# Patient Record
Sex: MALE | Race: WHITE | NOT HISPANIC OR LATINO
[De-identification: names, ages, dates, MRNs, and addresses within clinical notes are randomized per-mention and may not be internally consistent; named-entity substitution may affect disease eponyms.]

---

## 2019-12-18 PROBLEM — Z00.00 ENCOUNTER FOR PREVENTIVE HEALTH EXAMINATION: Status: ACTIVE | Noted: 2019-12-18

## 2020-01-10 ENCOUNTER — APPOINTMENT (OUTPATIENT)
Dept: INTERNAL MEDICINE | Facility: CLINIC | Age: 25
End: 2020-01-10
Payer: COMMERCIAL

## 2020-01-10 VITALS
RESPIRATION RATE: 16 BRPM | SYSTOLIC BLOOD PRESSURE: 188 MMHG | HEIGHT: 71 IN | DIASTOLIC BLOOD PRESSURE: 83 MMHG | BODY MASS INDEX: 26.32 KG/M2 | WEIGHT: 188 LBS | HEART RATE: 88 BPM

## 2020-01-10 DIAGNOSIS — Z23 ENCOUNTER FOR IMMUNIZATION: ICD-10-CM

## 2020-01-10 DIAGNOSIS — Z71.89 OTHER SPECIFIED COUNSELING: ICD-10-CM

## 2020-01-10 DIAGNOSIS — Z72.89 OTHER PROBLEMS RELATED TO LIFESTYLE: ICD-10-CM

## 2020-01-10 PROCEDURE — 90691 TYPHOID VACCINE IM: CPT

## 2020-01-10 PROCEDURE — 99203 OFFICE O/P NEW LOW 30 MIN: CPT | Mod: 25

## 2020-01-10 PROCEDURE — 90471 IMMUNIZATION ADMIN: CPT

## 2020-01-10 RX ORDER — AZITHROMYCIN 250 MG/1
250 TABLET, FILM COATED ORAL
Qty: 1 | Refills: 0 | Status: ACTIVE | COMMUNITY
Start: 2020-01-10 | End: 1900-01-01

## 2020-01-10 RX ORDER — ATOVAQUONE AND PROGUANIL HYDROCHLORIDE 250; 100 MG/1; MG/1
250-100 TABLET, FILM COATED ORAL DAILY
Qty: 16 | Refills: 0 | Status: ACTIVE | COMMUNITY
Start: 2020-01-10 | End: 1900-01-01

## 2020-01-10 RX ORDER — FLUTICASONE PROPIONATE 100 %
POWDER (GRAM) MISCELLANEOUS
Refills: 0 | Status: ACTIVE | COMMUNITY

## 2020-01-10 RX ORDER — MONTELUKAST 10 MG/1
TABLET, FILM COATED ORAL
Refills: 0 | Status: ACTIVE | COMMUNITY

## 2020-01-10 NOTE — PHYSICAL EXAM
[General Appearance - Alert] : alert [General Appearance - In No Acute Distress] : in no acute distress [Sclera] : the sclera and conjunctiva were normal [PERRL With Normal Accommodation] : pupils were equal in size, round, reactive to light [Extraocular Movements] : extraocular movements were intact [Outer Ear] : the ears and nose were normal in appearance [Oropharynx] : the oropharynx was normal with no thrush [Auscultation Breath Sounds / Voice Sounds] : lungs were clear to auscultation bilaterally [Heart Rate And Rhythm] : heart rate was normal and rhythm regular [Heart Sounds] : normal S1 and S2 [Heart Sounds Gallop] : no gallops [Murmurs] : no murmurs [Full Pulse] : the pedal pulses are present [Heart Sounds Pericardial Friction Rub] : no pericardial rub [Edema] : there was no peripheral edema [Bowel Sounds] : normal bowel sounds [Abdomen Tenderness] : non-tender [Abdomen Soft] : soft [Abdomen Mass (___ Cm)] : no abdominal mass palpated [Musculoskeletal - Swelling] : no joint swelling [Costovertebral Angle Tenderness] : no CVA tenderness [Nail Clubbing] : no clubbing  or cyanosis of the fingernails [Skin Color & Pigmentation] : normal skin color and pigmentation [Motor Tone] : muscle strength and tone were normal [Sensation] : the sensory exam was normal to light touch and pinprick [Deep Tendon Reflexes (DTR)] : deep tendon reflexes were 2+ and symmetric [] : no rash [No Focal Deficits] : no focal deficits [Affect] : the affect was normal [Oriented To Time, Place, And Person] : oriented to person, place, and time

## 2020-01-10 NOTE — HISTORY OF PRESENT ILLNESS
[FreeTextEntry1] : This 24 y.o. 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 \par Then they are traveling to [facility] x2 days\par \par They plan to return on 2/23/2020.\par \par No prior vaccination for hepatitis A\par no prior vaccination for Typhoid\par \par not an adventure eater. \par \par
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
## 2020-10-24 ENCOUNTER — EMERGENCY (EMERGENCY)
Facility: HOSPITAL | Age: 25
LOS: 1 days | Discharge: HOME | End: 2020-10-24
Attending: EMERGENCY MEDICINE
Payer: SELF-PAY

## 2020-10-24 VITALS
DIASTOLIC BLOOD PRESSURE: 78 MMHG | TEMPERATURE: 98 F | HEART RATE: 75 BPM | SYSTOLIC BLOOD PRESSURE: 136 MMHG | HEIGHT: 71 IN | OXYGEN SATURATION: 98 % | WEIGHT: 190.04 LBS | RESPIRATION RATE: 18 BRPM

## 2020-10-24 PROCEDURE — 12011 RPR F/E/E/N/L/M 2.5 CM/<: CPT

## 2020-10-24 PROCEDURE — 99282 EMERGENCY DEPT VISIT SF MDM: CPT | Mod: 25

## 2020-10-24 PROCEDURE — 99283 EMERGENCY DEPT VISIT LOW MDM: CPT | Mod: 25

## 2020-10-24 NOTE — ED PROVIDER NOTE - PATIENT PORTAL LINK FT
You can access the FollowMyHealth Patient Portal offered by [facility] by registering at the following website: [email or web address]. By joining [employer]’s FollowMyHealth portal, you will also be able to view your health information using other applications (apps) compatible with our system.
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
## 2020-10-24 NOTE — ED PROVIDER NOTE - NSFOLLOWUPINSTRUCTIONS_ED_ALL_ED_FT
Please do not apply Lotion to your laceration site  No water to face for 48 hrs.   F/U with your PCP as discussed.

## 2021-04-18 ENCOUNTER — TRANSCRIPTION ENCOUNTER (OUTPATIENT)
Age: 26
End: 2021-04-18

## 2021-04-19 ENCOUNTER — APPOINTMENT (OUTPATIENT)
Dept: FAMILY MEDICINE | Facility: CLINIC | Age: 26
End: 2021-04-19

## 2021-06-07 ENCOUNTER — APPOINTMENT (OUTPATIENT)
Dept: ORTHOPEDIC SURGERY | Facility: CLINIC | Age: 26
End: 2021-06-07

## 2021-06-17 ENCOUNTER — APPOINTMENT (OUTPATIENT)
Dept: ORTHOPEDIC SURGERY | Facility: CLINIC | Age: 26
End: 2021-06-17

## 2021-07-30 ENCOUNTER — NON-APPOINTMENT (OUTPATIENT)
Age: 26
End: 2021-07-30

## 2021-07-30 ENCOUNTER — APPOINTMENT (OUTPATIENT)
Dept: ORTHOPEDIC SURGERY | Facility: CLINIC | Age: 26
End: 2021-07-30
Payer: COMMERCIAL

## 2021-07-30 PROCEDURE — 11730 AVULSION NAIL PLATE SIMPLE 1: CPT | Mod: TA

## 2021-07-30 PROCEDURE — 99204 OFFICE O/P NEW MOD 45 MIN: CPT | Mod: 25

## 2021-07-30 RX ORDER — MELOXICAM 7.5 MG/1
7.5 TABLET ORAL
Qty: 30 | Refills: 2 | Status: ACTIVE | COMMUNITY
Start: 2021-07-30 | End: 1900-01-01

## 2021-07-30 RX ORDER — ACETAMINOPHEN 500 MG/1
500 TABLET, COATED ORAL
Qty: 40 | Refills: 0 | Status: ACTIVE | COMMUNITY
Start: 2021-07-30 | End: 1900-01-01

## 2021-07-30 RX ORDER — CEPHALEXIN 500 MG/1
500 CAPSULE ORAL 3 TIMES DAILY
Qty: 21 | Refills: 0 | Status: ACTIVE | COMMUNITY
Start: 2021-07-30 | End: 1900-01-01

## 2021-07-30 NOTE — PHYSICAL EXAM
[de-identified] : General: Alert and oriented x3. In no acute distress. Pleasant in nature with a normal affect. No apparent respiratory distress.\par \par Left Foot\par Skin: Clean, dry, intact\par Inspection: No obvious malalignment, no masses, no swelling, no effusion\par Pulses: 2+ DP/PT pulses\par ROM: FOOT Full  ROM of digits, ANKLE 10 degrees of dorsiflexion, 40 degrees of plantarflexion, 10 degrees of subtalar motion.\par Painful ROM: None\par Tenderness: No tenderness over the medial malleolus, No tenderness over the lateral malleolus, no CFL/ATFL/PTFL pain, no deltoid ligament pain. No heel pain. No Achilles tenderness. No 5th metatarsal pain. No pain to the LisFranc joint. No ttp over the posterior tibial tendon.\par Stability: Negative anterior/posterior drawer.\par Strength: 5/5 ADD/ABD/TA/GS/EHL/FHL/EDL\par Neuro: Sensation in tact to light touch throughout\par Additional tests: Negative Mortons test, negative tarsal tunnel tinels, negative single heel rise.\par \par Left Big Toe Exam\par Inspection: Slight erythema\par ROM: Normal	  [de-identified] : No new imaging

## 2021-07-30 NOTE — HISTORY OF PRESENT ILLNESS
[FreeTextEntry1] : FANNY ROLAND is a 25 year old male who presents for initial evaluation of left in grown toe nail. Patient states this has been bothering him for a few weeks now. He has cut his nail a few weeks ago and noticed the pain start. He states there was some pus coming out a few days ago. The pain is 6/10 that is localized sharp and achy.

## 2021-07-30 NOTE — ADDENDUM
[FreeTextEntry1] : I, Todd Llamas, acted solely as a scribe for Dr. Ranjith Rosales on this date 07/30/2021  .\par  \par All medical record entries made by the Scribe were at my, Dr. Ranjith Rosales, direction and personally dictated by me on 07/30/2021 . I have reviewed the chart and agree that the record accurately reflects my personal performance of the history, physical exam, assessment and plan. I have also personally directed, reviewed, and agreed with the chart.

## 2021-07-30 NOTE — DISCUSSION/SUMMARY
[de-identified] : Today I had a lengthy discussion with the patient regarding their left great toe in grown nail pain. I have addressed all the patient's concerns surrounding the pathology of their condition. \par \par We discussed partial left great toe nail removal and patient agreed to the procedure. The patient tolerated the procedure well. I recommend that the patient take Keflex antibiotics. A prescription was provided in the office today. I recommend Tylenol extra strength. \par \par I would like to see the patient back in the office in 1 week to reassess their condition. The patient understood and verbally agreed to the treatment plan. All of their questions were answered and they were satisfied with the visit. The patient should call the office if they have any questions or experience worsening symptoms.

## 2021-07-30 NOTE — PROCEDURE
[FreeTextEntry1] : Left Great Toe Nail Removal\par \par The risks and benefits of the left great toe nail removal were reviewed with the patient today in office and all their questions were answered. Prior to removing the left great toe nail, the site was prepped with ChloraPrep and a sterile field was created. Sterile technique was carried out throughout the procedure. After verbal consent from the patient we went ahead and removed the left great toe nail today. There were no complications during the procedure. The patient tolerated the procedure well with no resistance. The patient denied any pain and had good motion of the toe. Post removal, the toe was wrapped in a dressing.

## 2021-08-04 ENCOUNTER — APPOINTMENT (OUTPATIENT)
Dept: ORTHOPEDIC SURGERY | Facility: CLINIC | Age: 26
End: 2021-08-04
Payer: COMMERCIAL

## 2021-08-04 DIAGNOSIS — L60.0 INGROWING NAIL: ICD-10-CM

## 2021-08-04 PROCEDURE — 99213 OFFICE O/P EST LOW 20 MIN: CPT

## 2021-08-04 NOTE — PHYSICAL EXAM
[de-identified] : General: Alert and oriented x3. In no acute distress. Pleasant in nature with a normal affect. No apparent respiratory distress.\par \par Left Foot\par Skin: Clean, dry, intact\par Inspection: No obvious malalignment, no masses, no swelling, no effusion\par Pulses: 2+ DP/PT pulses\par ROM: FOOT Full  ROM of digits, ANKLE 10 degrees of dorsiflexion, 40 degrees of plantarflexion, 10 degrees of subtalar motion.\par Painful ROM: None\par Tenderness: No tenderness over the medial malleolus, No tenderness over the lateral malleolus, no CFL/ATFL/PTFL pain, no deltoid ligament pain. No heel pain. No Achilles tenderness. No 5th metatarsal pain. No pain to the LisFranc joint. No ttp over the posterior tibial tendon.\par Stability: Negative anterior/posterior drawer.\par Strength: 5/5 ADD/ABD/TA/GS/EHL/FHL/EDL\par Neuro: Sensation in tact to light touch throughout\par Additional tests: Negative Mortons test, negative tarsal tunnel tinels, negative single heel rise.\par \par Left Big Toe Exam\par Inspection: Slight erythema, Improved since his last visit\par ROM: Normal	  [de-identified] : No new imaging

## 2021-08-04 NOTE — ADDENDUM
[FreeTextEntry1] : I, Dianne An, acted solely as a scribe for Dr. Ranjith Rosales on this date 08/04/2021.\par \par All medical record entries made by the Scribe were at my, Dr. Ranjith Rosales, direction and personally dictated by me on 08/04/2021 . I have reviewed the chart and agree that the record accurately reflects my personal performance of the history, physical exam, assessment and plan. I have also personally directed, reviewed, and agreed with the chart.	\par

## 2021-08-04 NOTE — DISCUSSION/SUMMARY
[de-identified] : Today I had a lengthy discussion with the patient regarding their left great toe in grown nail pain. I have addressed all the patient's concerns surrounding the pathology of their condition. I recommend that the patient finish his previously prescribed Keflex antibiotics. I would like to see the patient back in the office PRN to reassess their condition. The patient understood and verbally agreed to the treatment plan. All of their questions were answered and they were satisfied with the visit. The patient should call the office if they have any questions or experience worsening symptoms.

## 2021-08-04 NOTE — HISTORY OF PRESENT ILLNESS
[Stiff Soled Shoes] : stiff soled shoes [FreeTextEntry1] : 8/4/2021: FANNY ROLAND is a 25 year old male presenting for a follow-up evaluation of left in grown toe nail pain. The patient’s notes that his pain has improved since his last visit. He takes Tylenol daily. He is also currently taking Keflex.	\par \par \par 7/30/21: FANNY ROLAND is a 25 year old male who presents for initial evaluation of left in grown toe nail. Patient states this has been bothering him for a few weeks now. He has cut his nail a few weeks ago and noticed the pain start. He states there was some pus coming out a few days ago. The pain is 6/10 that is localized sharp and achy.

## 2024-07-08 ENCOUNTER — APPOINTMENT (OUTPATIENT)
Dept: PULMONOLOGY | Facility: CLINIC | Age: 29
End: 2024-07-08

## 2024-07-25 ENCOUNTER — APPOINTMENT (OUTPATIENT)
Dept: PULMONOLOGY | Facility: CLINIC | Age: 29
End: 2024-07-25

## 2024-07-27 ENCOUNTER — EMERGENCY (EMERGENCY)
Facility: HOSPITAL | Age: 29
LOS: 0 days | Discharge: HOME | End: 2024-07-27
Attending: EMERGENCY MEDICINE
Payer: SELF-PAY

## 2024-07-27 VITALS
WEIGHT: 190.04 LBS | SYSTOLIC BLOOD PRESSURE: 128 MMHG | RESPIRATION RATE: 16 BRPM | TEMPERATURE: 98 F | HEART RATE: 80 BPM | OXYGEN SATURATION: 99 % | DIASTOLIC BLOOD PRESSURE: 78 MMHG

## 2024-07-27 VITALS
SYSTOLIC BLOOD PRESSURE: 120 MMHG | HEART RATE: 72 BPM | DIASTOLIC BLOOD PRESSURE: 77 MMHG | OXYGEN SATURATION: 100 % | RESPIRATION RATE: 16 BRPM | TEMPERATURE: 98 F

## 2024-07-27 DIAGNOSIS — F10.129 ALCOHOL ABUSE WITH INTOXICATION, UNSPECIFIED: ICD-10-CM

## 2024-07-27 DIAGNOSIS — R55 SYNCOPE AND COLLAPSE: ICD-10-CM

## 2024-07-27 DIAGNOSIS — R40.4 TRANSIENT ALTERATION OF AWARENESS: ICD-10-CM

## 2024-07-27 PROCEDURE — 70450 CT HEAD/BRAIN W/O DYE: CPT | Mod: MC

## 2024-07-27 PROCEDURE — 70450 CT HEAD/BRAIN W/O DYE: CPT | Mod: 26,MC

## 2024-07-27 PROCEDURE — 99285 EMERGENCY DEPT VISIT HI MDM: CPT | Mod: 25

## 2024-07-27 PROCEDURE — 99284 EMERGENCY DEPT VISIT MOD MDM: CPT

## 2024-07-27 PROCEDURE — 72125 CT NECK SPINE W/O DYE: CPT | Mod: MC

## 2024-07-27 PROCEDURE — 82962 GLUCOSE BLOOD TEST: CPT

## 2024-07-27 PROCEDURE — 93005 ELECTROCARDIOGRAM TRACING: CPT

## 2024-07-27 PROCEDURE — 72125 CT NECK SPINE W/O DYE: CPT | Mod: 26,MC

## 2024-07-27 PROCEDURE — 99053 MED SERV 10PM-8AM 24 HR FAC: CPT

## 2024-07-27 PROCEDURE — 93010 ELECTROCARDIOGRAM REPORT: CPT

## 2024-07-27 NOTE — ED ADULT NURSE NOTE - NSFALLRISKINTERV_ED_ALL_ED
Communicate fall risk and risk factors to all staff, patient, and family/Reinforce activity limits and safety measures with patient and family/Call bell, personal items and telephone in reach/Instruct patient to call for assistance before getting out of bed/chair/stretcher/Non-slip footwear applied when patient is off stretcher/[place] to call system/Physically safe environment - no spills, clutter or unnecessary equipment/Purposeful Proactive Rounding/Room/bathroom lighting operational, light cord in reach

## 2024-07-27 NOTE — ED PROVIDER NOTE - PATIENT PORTAL LINK FT
You can access the FollowMyHealth Patient Portal offered by [facility] by registering at the following website: [email or web address]. By joining [employer]’s FollowMyHealth portal, you will also be able to view your health information using other applications (apps) compatible with our system.

## 2024-07-27 NOTE — ED PROVIDER NOTE - OBJECTIVE STATEMENT
28-year-old male with no pertinent past medical history brought in by EMS status post syncopal episode found unresponsive at the [facility].  The patient arrives in a cervical collar.  Patient initially not providing any HPI or review of systems.  It was reported that he has been drinking prior to arrival.

## 2024-07-27 NOTE — ED PROVIDER NOTE - CLINICAL SUMMARY MEDICAL DECISION MAKING FREE TEXT BOX
28-year-old male with no pertinent past medical history brought in by EMS status post syncopal episode found unresponsive at the [facility].  The patient arrives in a cervical collar.  Patient initially not providing any HPI or review of systems.  It was reported that he has been drinking prior to arrival.    CT head and C-spine to r/o ICH and/or fracture, EKG, observe in ED.

## 2024-07-27 NOTE — ED PROVIDER NOTE - NSFOLLOWUPINSTRUCTIONS_ED_ALL_ED_FT
Syncope    Syncope is when you temporarily lose consciousness, also called fainting or passing out. It is caused by a sudden decrease in blood flow to the brain. Even though most causes of syncope are not dangerous, syncope can possibly be a sign of a serious medical problem. Signs that you may be about to faint include feeling dizzy, lightheaded, nausea, visual changes, or cold/clammy skin. Do not drive, operate heavy machinery, or play sports until your health care provider says it is okay.    SEEK IMMEDIATE MEDICAL CARE IF YOU HAVE ANY OF THE FOLLOWING SYMPTOMS: severe headache, pain in your chest/abdomen/back, bleeding from your mouth or rectum, palpitations, shortness of breath, pain with breathing, seizure, confusion, or trouble walking.    Alcohol Abuse    Alcohol intoxication occurs when the amount of alcohol that a person has consumed impairs his or her ability to mentally and physically function. Chronic alcohol consumption can also lead to a variety of health issues including neurological disease, stomach disease, heart disease, liver disease, etc. Do not drive after drinking alcohol. Drinking enough alcohol to end up in an Emergency Room suggests you may have an alcohol abuse problem. Seek help at a drug addiction center.    SEEK IMMEDIATE MEDICAL CARE IF YOU HAVE ANY OF THE FOLLOWING SYMPTOMS: seizures, vomiting blood, blood in your stool, lightheadedness/dizziness, or becoming shaky to tremulous when you stop drinking.
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
## 2025-04-11 NOTE — ED PROVIDER NOTE - TEMPLATE, MLM
Gastroenterology Surgery Hospitalist Surgery Hospitalist Hospitalist Hospitalist Surgery Surgery Surgery Hospitalist Wounds

## 2025-05-29 NOTE — ED ADULT NURSE NOTE - OBJECTIVE STATEMENT
Pt presents to the ED s/p two syncopal episodes as per EMS. Pt is A&Ox4 and ambulatory at baseline. Pt states "I drank five beers today. I was told to come here". Pt denies passing out. BEFAST negative. Pt denies pain at this time. No further complaints at this time.
(3) no apparent problem